# Patient Record
Sex: FEMALE | Race: BLACK OR AFRICAN AMERICAN | NOT HISPANIC OR LATINO | Employment: OTHER | URBAN - METROPOLITAN AREA
[De-identification: names, ages, dates, MRNs, and addresses within clinical notes are randomized per-mention and may not be internally consistent; named-entity substitution may affect disease eponyms.]

---

## 2021-01-20 PROBLEM — I10 HYPERTENSION: Status: ACTIVE | Noted: 2021-01-20

## 2021-01-20 PROBLEM — E87.1 HYPONATREMIA: Status: ACTIVE | Noted: 2021-01-20

## 2022-03-07 PROBLEM — F41.9 ANXIETY: Status: ACTIVE | Noted: 2022-03-07

## 2023-08-14 PROBLEM — R00.2 PALPITATIONS: Status: ACTIVE | Noted: 2023-08-14

## 2023-08-15 PROBLEM — R53.83 FATIGUE: Status: ACTIVE | Noted: 2023-08-15

## 2023-11-28 ENCOUNTER — PATIENT OUTREACH (OUTPATIENT)
Dept: ADMINISTRATIVE | Facility: OTHER | Age: 78
End: 2023-11-28
Payer: MEDICARE

## 2023-11-28 NOTE — PROGRESS NOTES
CHW - Initial Contact    This Community Health Worker completed the Social Determinant of Health questionnaire with patient during clinic visit today.    Pt identified barriers of most importance are: patient identified no barriers of importance during clinic visit    Referrals to community agencies completed with patient consent outside of [facility] include: No community referral needed during clinic visit   Referrals were put through [facility] - no:   Support and Services: No support services needed during clinic visit   Other information discussed the patient needs help with: patient discussed no other importance during clinic visit    Follow up required: Yes  Follow-up Outreach - Due: 12/12/2023

## 2023-12-18 ENCOUNTER — PATIENT OUTREACH (OUTPATIENT)
Dept: ADMINISTRATIVE | Facility: OTHER | Age: 78
End: 2023-12-18
Payer: MEDICARE

## 2023-12-18 NOTE — PROGRESS NOTES
CHW - Follow Up    This Community Health Worker completed a follow up visit with patient via telephone today.  Pt reported: patient reported she is doing great no assistance is needed during phone interview  Patient will reach out if assistance is needed in the future. Patient was also given next appointment date and time  Community Health Worker provided: CHW spoke with patient she is doing great.   Follow up required: No  No future outreach task assigned   CHW - Case Closure    This Community Health Worker spoke to patient via telephone today.   Pt reported:  patient reported she is doing great no assistance is needed during phone interview  Patient will reach out if assistance is needed in the future. Patient was also given next appointment date and time  Pt denied any additional needs at this time and agrees with episode closure at this time.    Provided patient with Community Health Worker's contact information and encouraged   her to contact this Community Health Worker if additional needs arise.

## 2024-03-25 ENCOUNTER — PATIENT OUTREACH (OUTPATIENT)
Dept: ADMINISTRATIVE | Facility: OTHER | Age: 79
End: 2024-03-25
Payer: MEDICARE

## 2024-03-25 NOTE — PROGRESS NOTES
CHW - Initial Contact    This Community Health Worker completed the Social Determinant of Health questionnaire with patient during clinic visit today.    Pt identified barriers of most importance are: patient identified no barriers of importance during clinic visit    Referrals to community agencies completed with patient consent outside of [facility] include: No community referral needed during clinic visit   Referrals were put through [facility] - no:   Support and Services: No support services needed during clinic visit   Other information discussed the patient needs  help with: patient discussed no other information during clinic visit    Follow up required: yes  Follow-up Outreach - Due: 4/8/2024

## 2024-04-08 ENCOUNTER — PATIENT OUTREACH (OUTPATIENT)
Dept: ADMINISTRATIVE | Facility: OTHER | Age: 79
End: 2024-04-08
Payer: MEDICARE

## 2024-04-08 NOTE — PROGRESS NOTES
CHW - Outreach Attempt    Community Health Worker left a voicemail message for 1st attempt to contact patient regarding: initial screening SDOH   Follow up phone interview.   Community Health Worker to attempt to contact patient on: 04/08/2024.

## 2024-04-25 ENCOUNTER — PATIENT OUTREACH (OUTPATIENT)
Dept: ADMINISTRATIVE | Facility: OTHER | Age: 79
End: 2024-04-25
Payer: MEDICARE

## 2024-04-25 NOTE — PROGRESS NOTES
CHW - Outreach Attempt    Community Health Worker left a voicemail message for 2nd attempt to contact patient regarding: follow up phone interview,  Community Health Worker to attempt to contact patient on: 04/25/2024

## 2024-04-30 ENCOUNTER — PATIENT OUTREACH (OUTPATIENT)
Dept: ADMINISTRATIVE | Facility: OTHER | Age: 79
End: 2024-04-30
Payer: MEDICARE

## 2024-04-30 NOTE — PROGRESS NOTES
CHW - Outreach Attempt    Community Health Worker left a voicemail message for 3rd attempt to contact patient regarding: initial screening SDOH follow   Up phone interview.   Community Health Worker to attempt to contact patient on: 04/30/2024

## 2024-05-13 ENCOUNTER — PATIENT OUTREACH (OUTPATIENT)
Dept: ADMINISTRATIVE | Facility: OTHER | Age: 79
End: 2024-05-13
Payer: MEDICARE

## 2024-05-13 NOTE — PROGRESS NOTES
CHW - Follow Up    This Community Health Worker completed a follow up visit with patient via telephone today.  Pt reported: patient reported she is doing alright no assistance is needed during follow up phone interview.   Patient will reach out if assistance is needed in the future.   Community Health Worker provided: CHW spoke with patient she is doing alright  Follow up required: No  Follow-up Outreach, Follow-up Outreach, Follow-up Outreach - Due: 4/30/2024, 5/13/2024, 5/7/2024

## 2024-05-13 NOTE — PROGRESS NOTES
CHW - Case Closure    This Community Health Worker spoke to patient via telephone today.   Pt reported: patient reported she is doing alright no assistance is needed during follow up phone interview.   Patient will reach out if assistance is needed In the future.   Pt denied any additional needs at this time and agrees with episode closure at this time.    Provided patient with Community Health Worker's contact information and encouraged   /her to contact this Community Health Worker if additional needs arise.